# Patient Record
Sex: FEMALE | Race: WHITE | NOT HISPANIC OR LATINO | URBAN - METROPOLITAN AREA
[De-identification: names, ages, dates, MRNs, and addresses within clinical notes are randomized per-mention and may not be internally consistent; named-entity substitution may affect disease eponyms.]

---

## 2021-05-14 ENCOUNTER — OFFICE VISIT (OUTPATIENT)
Dept: URBAN - METROPOLITAN AREA CLINIC 70 | Facility: CLINIC | Age: 71
End: 2021-05-14

## 2021-05-14 ENCOUNTER — OFFICE VISIT (OUTPATIENT)
Dept: URBAN - METROPOLITAN AREA CLINIC 70 | Facility: CLINIC | Age: 71
End: 2021-05-14
Payer: MEDICARE

## 2021-05-14 ENCOUNTER — LAB OUTSIDE AN ENCOUNTER (OUTPATIENT)
Dept: URBAN - METROPOLITAN AREA CLINIC 70 | Facility: CLINIC | Age: 71
End: 2021-05-14

## 2021-05-14 ENCOUNTER — WEB ENCOUNTER (OUTPATIENT)
Dept: URBAN - METROPOLITAN AREA CLINIC 70 | Facility: CLINIC | Age: 71
End: 2021-05-14

## 2021-05-14 VITALS
WEIGHT: 223.4 LBS | HEIGHT: 62 IN | BODY MASS INDEX: 41.11 KG/M2 | SYSTOLIC BLOOD PRESSURE: 133 MMHG | TEMPERATURE: 97.7 F | HEART RATE: 84 BPM | DIASTOLIC BLOOD PRESSURE: 83 MMHG

## 2021-05-14 DIAGNOSIS — R07.89 OTHER CHEST PAIN: ICD-10-CM

## 2021-05-14 DIAGNOSIS — K21.9 GASTROESOPHAGEAL REFLUX DISEASE, UNSPECIFIED WHETHER ESOPHAGITIS PRESENT: ICD-10-CM

## 2021-05-14 DIAGNOSIS — R15.2 FECAL URGENCY: ICD-10-CM

## 2021-05-14 DIAGNOSIS — R19.7 DIARRHEA, UNSPECIFIED TYPE: ICD-10-CM

## 2021-05-14 DIAGNOSIS — K52.832 LYMPHOCYTIC COLITIS: ICD-10-CM

## 2021-05-14 DIAGNOSIS — Z80.0 FAMILY HISTORY OF COLON CANCER: ICD-10-CM

## 2021-05-14 PROCEDURE — 99204 OFFICE O/P NEW MOD 45 MIN: CPT | Performed by: INTERNAL MEDICINE

## 2021-05-14 RX ORDER — PNV NO.95/FERROUS FUM/FOLIC AC 28MG-0.8MG
1 TABLET WITH A MEAL TABLET ORAL ONCE A DAY
Status: ACTIVE | COMMUNITY

## 2021-05-14 RX ORDER — ZINC 25 MG
1 TABLET TABLET ORAL ONCE A DAY
Status: ACTIVE | COMMUNITY

## 2021-05-14 RX ORDER — BENZOCAINE 20 %
AS DIRECTED GEL (GRAM) MUCOUS MEMBRANE
Status: ACTIVE | COMMUNITY

## 2021-05-14 RX ORDER — DICYCLOMINE HYDROCHLORIDE 10 MG/1
1 CAPSULE CAPSULE ORAL
Qty: 180 | Refills: 1 | OUTPATIENT
Start: 2021-05-14 | End: 2021-11-09

## 2021-05-14 RX ORDER — PANTOPRAZOLE SODIUM 40 MG/1
1 TABLET TABLET, DELAYED RELEASE ORAL
Qty: 90 | Refills: 1 | OUTPATIENT
Start: 2021-05-14

## 2021-05-14 RX ORDER — ASPIRIN 81 MG/1
TABLET, COATED ORAL
Qty: 0 | Refills: 0 | Status: ACTIVE | COMMUNITY
Start: 1900-01-01

## 2021-05-14 NOTE — HPI-TODAY'S VISIT:
Patient presents today for evaluation of diarrhea that has been present for the last 3 years.  Defecation occurs about 4 times in the morning and 2-3 times during the remainder of the day.  Usually occurs after eating, especially sweets or greasy foods.  Urgency and occasional fecal incontinence voiced.  Denies nocturnal diarrhea.  Has noticed increased lower abdominal cramping that increased prior to defecation and gradually improves once defecation is complete.  Stools are very thick and pasty and can be explosive at times.  Voices a complete sense of evacuation.  Prior to this change, defecation occured once a day with stools being formed in consistency.   Last colonoscopy 04/2018:  6 mm hepatic flexure polyp (TA), normal colonic mucosa.  Random bx was consistent with lymphocytic colitis.    Feels diarrhea has become more frequent since 2018.    Also reports occasional retro-sternal chest discomfort that is described as intermittent burning pressure.  Can occur on empty stomach or eating acidic foods.  This last for a few minutes before resolving or takes TUMS prn.  Has noticed increased belching and nausea along with complaints.   Voices normal stress test about 1-2 years.

## 2021-05-24 LAB
ADENOVIRUS F 40/41: NOT DETECTED
ASTROVIRUS: NOT DETECTED
C DIFFICILE TOXIN A/B: NOT DETECTED
C-REACTIVE PROTEIN, QUANT: 1
CAMPYLOBACTER: NOT DETECTED
CRYPTOSPORIDIUM: NOT DETECTED
CYCLOSPORA CAYETANENSIS: NOT DETECTED
E COLI O157: (no result)
ENDOMYSIAL ANTIBODY IGA: NEGATIVE
ENTAMOEBA HISTOLYTICA: NOT DETECTED
ENTEROAGGREGATIVE E COLI: NOT DETECTED
ENTEROPATHOGENIC E COLI: NOT DETECTED
ENTEROTOXIGENIC E COLI: NOT DETECTED
GIARDIA LAMBLIA: NOT DETECTED
IMMUNOGLOBULIN A, QN, SERUM: 251
NOROVIRUS GI/GII: NOT DETECTED
PLESIOMONAS SHIGELLOIDES: NOT DETECTED
ROTAVIRUS A: NOT DETECTED
SALMONELLA: NOT DETECTED
SAPOVIRUS: NOT DETECTED
SEDIMENTATION RATE-WESTERGREN: 15
SHIGA-TOXIN-PRODUCING E COLI: NOT DETECTED
SHIGELLA/ENTEROINVASIVE E COLI: NOT DETECTED
T-TRANSGLUTAMINASE (TTG) IGA: <2
VIBRIO CHOLERAE: NOT DETECTED
VIBRIO: NOT DETECTED
YERSINIA ENTEROCOLITICA: NOT DETECTED

## 2021-06-28 ENCOUNTER — OFFICE VISIT (OUTPATIENT)
Dept: URBAN - METROPOLITAN AREA SURGERY CENTER 24 | Facility: SURGERY CENTER | Age: 71
End: 2021-06-28

## 2021-06-28 ENCOUNTER — OFFICE VISIT (OUTPATIENT)
Dept: URBAN - METROPOLITAN AREA SURGERY CENTER 17 | Facility: SURGERY CENTER | Age: 71
End: 2021-06-28
Payer: MEDICARE

## 2021-06-28 ENCOUNTER — CLAIMS CREATED FROM THE CLAIM WINDOW (OUTPATIENT)
Dept: URBAN - METROPOLITAN AREA CLINIC 4 | Facility: CLINIC | Age: 71
End: 2021-06-28
Payer: MEDICARE

## 2021-06-28 DIAGNOSIS — K31.89 ACQUIRED DEFORMITY OF DUODENUM: ICD-10-CM

## 2021-06-28 DIAGNOSIS — R12 BURNING REFLUX: ICD-10-CM

## 2021-06-28 DIAGNOSIS — R19.7 ACUTE DIARRHEA: ICD-10-CM

## 2021-06-28 DIAGNOSIS — R11.0 CHRONIC NAUSEA: ICD-10-CM

## 2021-06-28 DIAGNOSIS — K52.832 LYMPHOCYTIC COLITIS: ICD-10-CM

## 2021-06-28 DIAGNOSIS — D12.3 BENIGN NEOPLASM OF TRANSVERSE COLON: ICD-10-CM

## 2021-06-28 DIAGNOSIS — D12.2 ADENOMA OF ASCENDING COLON: ICD-10-CM

## 2021-06-28 DIAGNOSIS — D12.3 ADENOMA OF TRANSVERSE COLON: ICD-10-CM

## 2021-06-28 DIAGNOSIS — K52.832 FOCAL LYMPHOCYTIC COLITIS: ICD-10-CM

## 2021-06-28 DIAGNOSIS — K29.40 CHRONIC ATROPHIC GASTRITIS WITHOUT BLEEDING: ICD-10-CM

## 2021-06-28 DIAGNOSIS — K31.89 GASTRIC FOVEOLAR HYPERPLASIA: ICD-10-CM

## 2021-06-28 DIAGNOSIS — D12.2 BENIGN NEOPLASM OF ASCENDING COLON: ICD-10-CM

## 2021-06-28 PROCEDURE — 88305 TISSUE EXAM BY PATHOLOGIST: CPT | Performed by: PATHOLOGY

## 2021-06-28 PROCEDURE — 45385 COLONOSCOPY W/LESION REMOVAL: CPT | Performed by: INTERNAL MEDICINE

## 2021-06-28 PROCEDURE — 45380 COLONOSCOPY AND BIOPSY: CPT | Performed by: INTERNAL MEDICINE

## 2021-06-28 PROCEDURE — 88342 IMHCHEM/IMCYTCHM 1ST ANTB: CPT | Performed by: PATHOLOGY

## 2021-06-28 PROCEDURE — 43239 EGD BIOPSY SINGLE/MULTIPLE: CPT | Performed by: INTERNAL MEDICINE

## 2021-06-28 PROCEDURE — 88313 SPECIAL STAINS GROUP 2: CPT | Performed by: PATHOLOGY

## 2021-06-28 PROCEDURE — G8907 PT DOC NO EVENTS ON DISCHARG: HCPCS | Performed by: INTERNAL MEDICINE

## 2021-08-10 ENCOUNTER — OFFICE VISIT (OUTPATIENT)
Dept: URBAN - METROPOLITAN AREA CLINIC 70 | Facility: CLINIC | Age: 71
End: 2021-08-10
Payer: MEDICARE

## 2021-08-10 DIAGNOSIS — K21.9 GASTROESOPHAGEAL REFLUX DISEASE WITHOUT ESOPHAGITIS: ICD-10-CM

## 2021-08-10 DIAGNOSIS — K52.832 LYMPHOCYTIC COLITIS: ICD-10-CM

## 2021-08-10 PROBLEM — 64226004: Status: ACTIVE | Noted: 2021-05-14

## 2021-08-10 PROBLEM — 428283002: Status: ACTIVE | Noted: 2021-08-10

## 2021-08-10 PROBLEM — 266435005: Status: ACTIVE | Noted: 2021-08-10

## 2021-08-10 PROCEDURE — 99214 OFFICE O/P EST MOD 30 MIN: CPT | Performed by: NURSE PRACTITIONER

## 2021-08-10 RX ORDER — DICYCLOMINE HYDROCHLORIDE 10 MG/1
1 CAPSULE CAPSULE ORAL
Qty: 180 | Refills: 1 | Status: ACTIVE | COMMUNITY
Start: 2021-05-14 | End: 2021-11-09

## 2021-08-10 RX ORDER — BUDESONIDE 3 MG/1
TAKE 3 CAPSULES ONCE A DAY X 8 WEEKS, THEN 2 CAPSULES DAILY X 2 WEEKS, THEN 1 CAPSULES DAILY X 2 WEEKS CAPSULE, COATED PELLETS ORAL ONCE A DAY
Qty: 105 | Refills: 0 | OUTPATIENT
Start: 2021-08-10

## 2021-08-10 RX ORDER — PANTOPRAZOLE SODIUM 40 MG/1
1 TABLET TABLET, DELAYED RELEASE ORAL
OUTPATIENT
Start: 2021-05-14

## 2021-08-10 RX ORDER — ZINC 25 MG
1 TABLET TABLET ORAL ONCE A DAY
Status: ACTIVE | COMMUNITY

## 2021-08-10 RX ORDER — PANTOPRAZOLE SODIUM 40 MG/1
1 TABLET TABLET, DELAYED RELEASE ORAL
Qty: 90 | Refills: 1 | Status: ACTIVE | COMMUNITY
Start: 2021-05-14

## 2021-08-10 RX ORDER — BENZOCAINE 20 %
AS DIRECTED GEL (GRAM) MUCOUS MEMBRANE
Status: ACTIVE | COMMUNITY

## 2021-08-10 RX ORDER — ASPIRIN 81 MG/1
TABLET, COATED ORAL
Qty: 0 | Refills: 0 | Status: ACTIVE | COMMUNITY
Start: 1900-01-01

## 2021-08-10 RX ORDER — PNV NO.95/FERROUS FUM/FOLIC AC 28MG-0.8MG
1 TABLET WITH A MEAL TABLET ORAL ONCE A DAY
Status: ACTIVE | COMMUNITY

## 2021-08-10 NOTE — HPI-OTHER HISTORIES
Office note from LOV 5/14/2021: Patient presents today for evaluation of diarrhea that has been present for the last 3 years.  Defecation occurs about 4 times in the morning and 2-3 times during the remainder of the day.  Usually occurs after eating, especially sweets or greasy foods.  Urgency and occasional fecal incontinence voiced.  Denies nocturnal diarrhea.  Has noticed increased lower abdominal cramping that increased prior to defecation and gradually improves once defecation is complete.  Stools are very thick and pasty and can be explosive at times.  Voices a complete sense of evacuation.  Prior to this change, defecation occured once a day with stools being formed in consistency.   Last colonoscopy 04/2018:  6 mm hepatic flexure polyp (TA), normal colonic mucosa.  Random bx was consistent with lymphocytic colitis.    Feels diarrhea has become more frequent since 2018.    Also reports occasional retro-sternal chest discomfort that is described as intermittent burning pressure.  Can occur on empty stomach or eating acidic foods.  This last for a few minutes before resolving or takes TUMS prn.  Has noticed increased belching and nausea along with complaints.   Voices normal stress test about 1-2 years.

## 2021-08-10 NOTE — HPI-TODAY'S VISIT:
Patient presents today for follow up in regards to diarrhea and GERD.  Underwent colonoscopy and EGD on 6/28/2021.  EGD showed normal esophagus, gastritis (negative for active h. pylori but suggestive for treated h. 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  Colonoscopy showed 3 adenoma polyps and sigmoid diverticulosis.  Random biopsies were obtained and consistent with lymphocytic colitis.  Defecation occurs up to 6-8 times a day without use of Immodium AD as needed.     Of note, denies prior treatment for h. pylori.   taking pantoprazole has helped to resolve symptoms of chest pain.   was seen by cardiology after LOV and diagnosed with right BBB.

## 2021-09-16 ENCOUNTER — TELEPHONE ENCOUNTER (OUTPATIENT)
Dept: URBAN - METROPOLITAN AREA CLINIC 6 | Facility: CLINIC | Age: 71
End: 2021-09-16

## 2021-09-16 RX ORDER — BUDESONIDE 3 MG/1
TAKE 3 CAPSULES ONCE A DAY X 8 WEEKS, THEN 2 CAPSULES DAILY X 2 WEEKS, THEN 1 CAPSULES DAILY X 2 WEEKS CAPSULE, COATED PELLETS ORAL ONCE A DAY
Qty: 105 | Refills: 0
Start: 2021-08-10

## 2021-09-23 ENCOUNTER — OFFICE VISIT (OUTPATIENT)
Dept: URBAN - METROPOLITAN AREA CLINIC 70 | Facility: CLINIC | Age: 71
End: 2021-09-23

## 2021-09-23 RX ORDER — ZINC 25 MG
1 TABLET TABLET ORAL ONCE A DAY
COMMUNITY

## 2021-09-23 RX ORDER — DICYCLOMINE HYDROCHLORIDE 10 MG/1
1 CAPSULE CAPSULE ORAL
Qty: 180 | Refills: 1 | COMMUNITY
Start: 2021-05-14 | End: 2021-11-09

## 2021-09-23 RX ORDER — BENZOCAINE 20 %
AS DIRECTED GEL (GRAM) MUCOUS MEMBRANE
COMMUNITY

## 2021-09-23 RX ORDER — PNV NO.95/FERROUS FUM/FOLIC AC 28MG-0.8MG
1 TABLET WITH A MEAL TABLET ORAL ONCE A DAY
COMMUNITY

## 2021-09-23 RX ORDER — PANTOPRAZOLE SODIUM 40 MG/1
1 TABLET TABLET, DELAYED RELEASE ORAL
COMMUNITY
Start: 2021-05-14

## 2021-09-23 RX ORDER — BUDESONIDE 3 MG/1
TAKE 3 CAPSULES ONCE A DAY X 8 WEEKS, THEN 2 CAPSULES DAILY X 2 WEEKS, THEN 1 CAPSULES DAILY X 2 WEEKS CAPSULE, COATED PELLETS ORAL ONCE A DAY
Qty: 105 | Refills: 0 | COMMUNITY
Start: 2021-08-10

## 2021-09-23 RX ORDER — ASPIRIN 81 MG/1
TABLET, COATED ORAL
Qty: 0 | Refills: 0 | COMMUNITY
Start: 1900-01-01

## 2024-03-05 ENCOUNTER — OV NP (OUTPATIENT)
Dept: URBAN - METROPOLITAN AREA CLINIC 70 | Facility: CLINIC | Age: 74
End: 2024-03-05
Payer: MEDICARE

## 2024-03-05 VITALS
WEIGHT: 209 LBS | SYSTOLIC BLOOD PRESSURE: 154 MMHG | TEMPERATURE: 98.2 F | BODY MASS INDEX: 38.46 KG/M2 | DIASTOLIC BLOOD PRESSURE: 97 MMHG | HEIGHT: 62 IN | HEART RATE: 91 BPM

## 2024-03-05 DIAGNOSIS — K52.832 LYMPHOCYTIC COLITIS: ICD-10-CM

## 2024-03-05 PROCEDURE — 99214 OFFICE O/P EST MOD 30 MIN: CPT | Performed by: REGISTERED NURSE

## 2024-03-05 RX ORDER — PNV NO.95/FERROUS FUM/FOLIC AC 28MG-0.8MG
1 TABLET WITH A MEAL TABLET ORAL ONCE A DAY
Status: DISCONTINUED | COMMUNITY

## 2024-03-05 RX ORDER — HYDROCHLOROTHIAZIDE 25 MG/1
1 TABLET IN THE MORNING TABLET ORAL ONCE A DAY
Status: ACTIVE | COMMUNITY

## 2024-03-05 RX ORDER — AMLODIPINE BESYLATE 10 MG/1
1 TABLET TABLET ORAL ONCE A DAY
Status: ACTIVE | COMMUNITY

## 2024-03-05 RX ORDER — BENZOCAINE 20 %
AS DIRECTED GEL (GRAM) MUCOUS MEMBRANE
Status: ACTIVE | COMMUNITY

## 2024-03-05 RX ORDER — CHOLESTYRAMINE 4 G/9G
1 SCOOP POWDER, FOR SUSPENSION ORAL ONCE A DAY
Qty: 360 GRAM | Refills: 3 | OUTPATIENT
Start: 2024-03-05

## 2024-03-05 RX ORDER — PANTOPRAZOLE SODIUM 40 MG/1
1 TABLET TABLET, DELAYED RELEASE ORAL
Status: DISCONTINUED | COMMUNITY
Start: 2021-05-14

## 2024-03-05 RX ORDER — ZINC 25 MG
1 TABLET TABLET ORAL ONCE A DAY
Status: DISCONTINUED | COMMUNITY

## 2024-03-05 RX ORDER — BUDESONIDE 3 MG/1
TAKE 3 CAPSULES ONCE A DAY X 8 WEEKS, THEN 2 CAPSULES DAILY X 2 WEEKS, THEN 1 CAPSULES DAILY X 2 WEEKS CAPSULE, COATED PELLETS ORAL ONCE A DAY
Qty: 105 | Refills: 0 | Status: DISCONTINUED | COMMUNITY
Start: 2021-08-10

## 2024-03-05 RX ORDER — LEVOTHYROXINE SODIUM 125 UG/1
1 TABLET IN THE MORNING ON AN EMPTY STOMACH TABLET ORAL ONCE A DAY
Status: ACTIVE | COMMUNITY

## 2024-03-05 RX ORDER — LOPERAMIDE HYDROCHLORIDE 2 MG/1
2 CAPSULES CAPSULE ORAL AT BEDTIME
Qty: 180 CAPSULES | Refills: 3 | OUTPATIENT
Start: 2024-03-05 | End: 2025-02-28

## 2024-03-05 RX ORDER — ASPIRIN 81 MG/1
TABLET, COATED ORAL
Qty: 0 | Refills: 0 | Status: ACTIVE | COMMUNITY
Start: 1900-01-01

## 2024-03-05 RX ORDER — ATORVASTATIN CALCIUM 40 MG/1
1 TABLET TABLET, FILM COATED ORAL ONCE A DAY
Status: ACTIVE | COMMUNITY

## 2024-03-05 NOTE — HPI-TODAY'S VISIT:
Pt has a hx of lymphocytic colitis. She reports multiple loose stools with urgency every day. Diarrhea is worse in the mornings. She has been prescribed budesonide in the past but did not take it because she was concerned about side effects of taking steroids plus it was expensive.  Colonoscopy 6/28/21 showed lymphocytic colitis and 2 adenomatous polyps(5 yr recall) EGD 6/28/21 showed gastritis(no hpylori)

## 2024-05-09 ENCOUNTER — OFFICE VISIT (OUTPATIENT)
Dept: URBAN - METROPOLITAN AREA CLINIC 70 | Facility: CLINIC | Age: 74
End: 2024-05-09

## 2024-05-16 ENCOUNTER — OFFICE VISIT (OUTPATIENT)
Dept: URBAN - METROPOLITAN AREA CLINIC 70 | Facility: CLINIC | Age: 74
End: 2024-05-16

## 2025-05-20 ENCOUNTER — OFFICE VISIT (OUTPATIENT)
Dept: URBAN - METROPOLITAN AREA CLINIC 70 | Facility: CLINIC | Age: 75
End: 2025-05-20
Payer: MEDICARE

## 2025-05-20 ENCOUNTER — DASHBOARD ENCOUNTERS (OUTPATIENT)
Age: 75
End: 2025-05-20

## 2025-05-20 DIAGNOSIS — K52.832 LYMPHOCYTIC COLITIS: ICD-10-CM

## 2025-05-20 PROCEDURE — 99214 OFFICE O/P EST MOD 30 MIN: CPT | Performed by: REGISTERED NURSE

## 2025-05-20 RX ORDER — CHOLESTYRAMINE 4 G/9G
1 SCOOP POWDER, FOR SUSPENSION ORAL ONCE A DAY
Qty: 360 GRAM | Refills: 3 | Status: ON HOLD | COMMUNITY
Start: 2024-03-05

## 2025-05-20 RX ORDER — LEVOTHYROXINE SODIUM 100 UG/1
1 TABLET IN THE MORNING ON AN EMPTY STOMACH CAPSULE ORAL ONCE A DAY
Status: ACTIVE | COMMUNITY

## 2025-05-20 RX ORDER — HYDROCHLOROTHIAZIDE 25 MG/1
1 TABLET IN THE MORNING TABLET ORAL ONCE A DAY
Status: ACTIVE | COMMUNITY

## 2025-05-20 RX ORDER — BENZOCAINE 20 %
AS DIRECTED GEL (GRAM) MUCOUS MEMBRANE
Status: ON HOLD | COMMUNITY

## 2025-05-20 RX ORDER — BUDESONIDE 3 MG/1
AS DIRECTED CAPSULE, DELAYED RELEASE PELLETS ORAL
Qty: 210 | Refills: 0 | OUTPATIENT
Start: 2025-05-20

## 2025-05-20 RX ORDER — ATORVASTATIN CALCIUM 40 MG/1
1 TABLET TABLET, FILM COATED ORAL ONCE A DAY
Status: ACTIVE | COMMUNITY

## 2025-05-20 RX ORDER — ASPIRIN 81 MG/1
TABLET, COATED ORAL
Qty: 0 | Refills: 0 | Status: ACTIVE | COMMUNITY
Start: 1900-01-01

## 2025-05-20 RX ORDER — NIFEDIPINE 90 MG/1
TABLET, EXTENDED RELEASE ORAL
Qty: 90 TABLET | Status: ACTIVE | COMMUNITY

## 2025-05-20 RX ORDER — AMLODIPINE BESYLATE 10 MG/1
1 TABLET TABLET ORAL ONCE A DAY
Status: ON HOLD | COMMUNITY

## 2025-05-20 NOTE — HPI-TODAY'S VISIT:
5/20/25: Pt has a hx of lymphocytic colitis. She is not on any treatment at this time. She has been hesitant in the past to take budesonide. She experienced adverse effects from cholestyramine. She will use Imodium prn when traveling.

## 2025-05-20 NOTE — HPI-OTHER HISTORIES
Note from OV 3/5/24: Pt has a hx of lymphocytic colitis. She reports multiple loose stools with urgency every day. Diarrhea is worse in the mornings. She has been prescribed budesonide in the past but did not take it because she was concerned about side effects of taking steroids plus it was expensive.  Colonoscopy 6/28/21 showed lymphocytic colitis and 2 adenomatous polyps(5 yr recall) EGD 6/28/21 showed gastritis(no hpylori) --------------------------------------------------

## 2025-05-23 ENCOUNTER — TELEPHONE ENCOUNTER (OUTPATIENT)
Dept: URBAN - METROPOLITAN AREA CLINIC 70 | Facility: CLINIC | Age: 75
End: 2025-05-23

## 2025-07-21 ENCOUNTER — WEB ENCOUNTER (OUTPATIENT)
Dept: URBAN - METROPOLITAN AREA CLINIC 70 | Facility: CLINIC | Age: 75
End: 2025-07-21

## 2025-07-21 ENCOUNTER — OFFICE VISIT (OUTPATIENT)
Dept: URBAN - METROPOLITAN AREA CLINIC 70 | Facility: CLINIC | Age: 75
End: 2025-07-21
Payer: MEDICARE

## 2025-07-21 DIAGNOSIS — K52.832 LYMPHOCYTIC COLITIS: ICD-10-CM

## 2025-07-21 PROCEDURE — 99214 OFFICE O/P EST MOD 30 MIN: CPT | Performed by: REGISTERED NURSE

## 2025-07-21 RX ORDER — BUDESONIDE 3 MG/1
AS DIRECTED CAPSULE, DELAYED RELEASE PELLETS ORAL
Qty: 210 | Refills: 0 | Status: ACTIVE | COMMUNITY
Start: 2025-05-20

## 2025-07-21 RX ORDER — ASPIRIN 81 MG/1
TABLET, COATED ORAL
Qty: 0 | Refills: 0 | Status: ACTIVE | COMMUNITY
Start: 1900-01-01

## 2025-07-21 RX ORDER — NIFEDIPINE 90 MG/1
TABLET, EXTENDED RELEASE ORAL
Qty: 90 TABLET | Status: ACTIVE | COMMUNITY

## 2025-07-21 RX ORDER — BUDESONIDE 3 MG/1
AS DIRECTED CAPSULE, DELAYED RELEASE PELLETS ORAL
Qty: 210 | Refills: 0 | OUTPATIENT
Start: 2025-07-21

## 2025-07-21 RX ORDER — ATORVASTATIN CALCIUM 40 MG/1
1 TABLET TABLET, FILM COATED ORAL ONCE A DAY
Status: ACTIVE | COMMUNITY

## 2025-07-21 RX ORDER — AMLODIPINE BESYLATE 10 MG/1
1 TABLET TABLET ORAL ONCE A DAY
Status: ON HOLD | COMMUNITY

## 2025-07-21 RX ORDER — HYDROCHLOROTHIAZIDE 25 MG/1
1 TABLET IN THE MORNING TABLET ORAL ONCE A DAY
Status: ACTIVE | COMMUNITY

## 2025-07-21 RX ORDER — LOPERAMIDE HYDROCHLORIDE 2 MG/1
2 CAPSULES CAPSULE ORAL AT BEDTIME
Qty: 180 CAPSULES | Refills: 3 | OUTPATIENT
Start: 2025-07-21 | End: 2026-07-16

## 2025-07-21 RX ORDER — LEVOTHYROXINE SODIUM 100 UG/1
1 TABLET IN THE MORNING ON AN EMPTY STOMACH CAPSULE ORAL ONCE A DAY
Status: ON HOLD | COMMUNITY

## 2025-07-21 RX ORDER — BENZOCAINE 20 %
AS DIRECTED GEL (GRAM) MUCOUS MEMBRANE
Status: ON HOLD | COMMUNITY

## 2025-07-21 RX ORDER — CHOLESTYRAMINE 4 G/9G
1 SCOOP POWDER, FOR SUSPENSION ORAL ONCE A DAY
Qty: 360 GRAM | Refills: 3 | Status: ON HOLD | COMMUNITY
Start: 2024-03-05

## 2025-07-21 NOTE — HPI-TODAY'S VISIT:
7/21/25: Pt took budesonide for 3 weeks but stopped taking it due to severe leg cramps. The cramps ressolved once the medication was stopped. She has tried cholestyramine in the past but the results were inconsistent. She is back taking loperamide prn and this seems to work the best for her. She has no new GI complaints at this time.

## 2025-07-21 NOTE — HPI-OTHER HISTORIES
Note from OV 3/5/24: Pt has a hx of lymphocytic colitis. She reports multiple loose stools with urgency every day. Diarrhea is worse in the mornings. She has been prescribed budesonide in the past but did not take it because she was concerned about side effects of taking steroids plus it was expensive.  Colonoscopy 6/28/21 showed lymphocytic colitis and 2 adenomatous polyps(5 yr recall) EGD 6/28/21 showed gastritis(no hpylori) -------------------------------------------------- Note from OV 5/20/25: Pt has a hx of lymphocytic colitis. She is not on any treatment at this time. She has been hesitant in the past to take budesonide. She experienced adverse effects from cholestyramine. She will use Imodium prn when traveling. ----------------------------------------------------